# Patient Record
Sex: FEMALE | Race: WHITE | NOT HISPANIC OR LATINO
[De-identification: names, ages, dates, MRNs, and addresses within clinical notes are randomized per-mention and may not be internally consistent; named-entity substitution may affect disease eponyms.]

---

## 2022-11-23 PROBLEM — Z00.00 ENCOUNTER FOR PREVENTIVE HEALTH EXAMINATION: Status: ACTIVE | Noted: 2022-11-23

## 2022-11-30 ENCOUNTER — APPOINTMENT (OUTPATIENT)
Dept: ANTEPARTUM | Facility: CLINIC | Age: 22
End: 2022-11-30

## 2022-11-30 ENCOUNTER — ASOB RESULT (OUTPATIENT)
Age: 22
End: 2022-11-30

## 2022-11-30 PROCEDURE — 76811 OB US DETAILED SNGL FETUS: CPT

## 2023-04-10 ENCOUNTER — APPOINTMENT (OUTPATIENT)
Dept: ANTEPARTUM | Facility: CLINIC | Age: 23
End: 2023-04-10

## 2023-04-17 ENCOUNTER — APPOINTMENT (OUTPATIENT)
Dept: ANTEPARTUM | Facility: CLINIC | Age: 23
End: 2023-04-17

## 2023-04-17 ENCOUNTER — APPOINTMENT (OUTPATIENT)
Dept: ANTEPARTUM | Facility: CLINIC | Age: 23
End: 2023-04-17
Payer: COMMERCIAL

## 2023-04-17 ENCOUNTER — ASOB RESULT (OUTPATIENT)
Age: 23
End: 2023-04-17

## 2023-04-17 PROCEDURE — 76816 OB US FOLLOW-UP PER FETUS: CPT

## 2023-04-17 PROCEDURE — 76818 FETAL BIOPHYS PROFILE W/NST: CPT | Mod: 59

## 2023-04-20 ENCOUNTER — ASOB RESULT (OUTPATIENT)
Age: 23
End: 2023-04-20

## 2023-04-20 ENCOUNTER — APPOINTMENT (OUTPATIENT)
Dept: ANTEPARTUM | Facility: CLINIC | Age: 23
End: 2023-04-20
Payer: COMMERCIAL

## 2023-04-20 PROCEDURE — 76818 FETAL BIOPHYS PROFILE W/NST: CPT

## 2023-04-24 ENCOUNTER — APPOINTMENT (OUTPATIENT)
Dept: ANTEPARTUM | Facility: CLINIC | Age: 23
End: 2023-04-24
Payer: COMMERCIAL

## 2023-04-24 ENCOUNTER — ASOB RESULT (OUTPATIENT)
Age: 23
End: 2023-04-24

## 2023-04-24 ENCOUNTER — OUTPATIENT (OUTPATIENT)
Dept: OUTPATIENT SERVICES | Facility: HOSPITAL | Age: 23
LOS: 1 days | End: 2023-04-24
Payer: COMMERCIAL

## 2023-04-24 VITALS — HEART RATE: 102 BPM | SYSTOLIC BLOOD PRESSURE: 128 MMHG | DIASTOLIC BLOOD PRESSURE: 70 MMHG | OXYGEN SATURATION: 97 %

## 2023-04-24 VITALS — SYSTOLIC BLOOD PRESSURE: 135 MMHG | DIASTOLIC BLOOD PRESSURE: 86 MMHG | HEART RATE: 90 BPM

## 2023-04-24 DIAGNOSIS — Z3A.41 41 WEEKS GESTATION OF PREGNANCY: ICD-10-CM

## 2023-04-24 DIAGNOSIS — O98.813 OTHER MATERNAL INFECTIOUS AND PARASITIC DISEASES COMPLICATING PREGNANCY, THIRD TRIMESTER: ICD-10-CM

## 2023-04-24 DIAGNOSIS — B95.1 STREPTOCOCCUS, GROUP B, AS THE CAUSE OF DISEASES CLASSIFIED ELSEWHERE: ICD-10-CM

## 2023-04-24 DIAGNOSIS — O26.899 OTHER SPECIFIED PREGNANCY RELATED CONDITIONS, UNSPECIFIED TRIMESTER: ICD-10-CM

## 2023-04-24 DIAGNOSIS — O48.0 POST-TERM PREGNANCY: ICD-10-CM

## 2023-04-24 DIAGNOSIS — O34.219 MATERNAL CARE FOR UNSPECIFIED TYPE SCAR FROM PREVIOUS CESAREAN DELIVERY: ICD-10-CM

## 2023-04-24 PROCEDURE — 76818 FETAL BIOPHYS PROFILE W/NST: CPT

## 2023-04-24 PROCEDURE — 99221 1ST HOSP IP/OBS SF/LOW 40: CPT

## 2023-04-24 PROCEDURE — 59025 FETAL NON-STRESS TEST: CPT | Mod: 26

## 2023-04-24 PROCEDURE — G0378: CPT

## 2023-04-24 PROCEDURE — G0463: CPT

## 2023-04-24 NOTE — OB PROVIDER TRIAGE NOTE - HISTORY OF PRESENT ILLNESS
23yo      @  41w 2 d     presents for repeat GERRI  Denies  VB or LOF has + FM.  Pt also has mild contractions      PNC: Dr sears  PNI: uncomplicated  PNL: GBS positive      All: NKDA  Meds: PNV  PMHx: Denies  PSHx:C/S  Socialhx: Denies x 3  OBhx: C/S  GYNhx: denies    T(C): --  HR: 89 (04-24-23 @ 17:00) (89 - 102)  BP: 128/70 (04-24-23 @ 16:45) (128/70 - 128/70)  SpO2: 98% (04-24-23 @ 17:00) (97% - 98%)  Gen: NAD  Heart: RRR  Lungs: CTA B/L  Abdomen: Gravid, NT  Ext: no calf tenderness    NST:  TOCO:  VE:   EFW:  BSUS:    A/P 22y P   @     presents with  - Fetal status -   - GBS     D/W    Cecilia Blue PA-C     21yo      @  41w 2 d     presents for repeat GERRI.  Pt was seen today for post dates testing and GERRI was 5.7.  Pt sent to triage to PO hydrate and to have repeat ultrasound  Denies  VB or LOF has + FM.  Pt does have mild contractions    PNC: Dr Angulo  PNI: uncomplicated  PNL: GBS positive      All: NKDA  Meds: PNV  PMHx: Denies  PSHx: C/S  Socialhx: Denies x 3  OBhx: 2021  FT  C/S  secondary to nrfht at 7cm  9lbs 2oz, uncomplicated  GYNhx: denies fibroids, ov cysts or STDs    T(C): --  HR: 89 (04-24-23 @ 17:00) (89 - 102)  BP: 128/70 (04-24-23 @ 16:45) (128/70 - 128/70)  SpO2: 98% (04-24-23 @ 17:00) (97% - 98%)    Gen: NAD  Heart: RRR  Lungs: CTA B/L  Abdomen: Gravid, NT  Ext: no calf tenderness    NST:125 moderate variabity + accels no decels  TOCO: occasional  VE:  1/60/-3 ( per Dr Angulo  EFW: 3400  BSUS:

## 2023-04-24 NOTE — OB RN TRIAGE NOTE - FALL HARM RISK - UNIVERSAL INTERVENTIONS
Bed in lowest position, wheels locked, appropriate side rails in place/Call bell, personal items and telephone in reach/Instruct patient to call for assistance before getting out of bed or chair/Non-slip footwear when patient is out of bed/[place] to call system/Physically safe environment - no spills, clutter or unnecessary equipment/Purposeful Proactive Rounding/Room/bathroom lighting operational, light cord in reach

## 2023-04-24 NOTE — OB PROVIDER TRIAGE NOTE - NSOBPROVIDERNOTE_OBGYN_ALL_OB_FT
A/P 21yo    @ 41w 2d    presents for further evaluation secondary to low GERRI  - Fetal status - NST/TOCO  - GBS positive  - PO hydrate until 6:30p and then repeat GERRI per Dr Angulo  D/W  Dr Kev ESCOBARC A/P 21yo    @ 41w 2d    presents for further evaluation secondary to low GERRI  - Fetal status - NST/TOCO  - GBS positive  - PO hydrate until 6:30p and then repeat GERRI per Dr Angulo  D/W  Dr Kev Blue PACHIOMA      R4 ADDENDUM  - GERRI repeated, MVP 6.7  - NST reactive: 120/mod/+accel/-decel  - return precautions  - f/u in 2 days (Wednesday) w/[facility]    d/w Dr. Kev Pressley R4

## 2023-04-24 NOTE — OB RN TRIAGE NOTE - NSLDARRIVAL_OBGYN_ALL_OB_END_DATE
Information: Selecting Yes will display possible errors in your note based on the variables you have selected. This validation is only offered as a suggestion for you. PLEASE NOTE THAT THE VALIDATION TEXT WILL BE REMOVED WHEN YOU FINALIZE YOUR NOTE. IF YOU WANT TO FAX A PRELIMINARY NOTE YOU WILL NEED TO TOGGLE THIS TO 'NO' IF YOU DO NOT WANT IT IN YOUR FAXED NOTE. 24-Apr-2023 20:09

## 2023-04-24 NOTE — OB PROVIDER TRIAGE NOTE - NS_VISITREASON1_OBGYN_ALL_OB
Phone Number Called: [phone] (home)       Call outcome: left message for patient to call back regarding message below    Message: 1 st attempt     Other

## 2023-04-25 ENCOUNTER — INPATIENT (INPATIENT)
Facility: HOSPITAL | Age: 23
LOS: 2 days | Discharge: HOME | End: 2023-04-28
Attending: OBSTETRICS & GYNECOLOGY | Admitting: OBSTETRICS & GYNECOLOGY
Payer: COMMERCIAL

## 2023-04-25 ENCOUNTER — TRANSCRIPTION ENCOUNTER (OUTPATIENT)
Age: 23
End: 2023-04-25

## 2023-04-25 VITALS — OXYGEN SATURATION: 97 % | HEART RATE: 93 BPM

## 2023-04-25 DIAGNOSIS — O26.899 OTHER SPECIFIED PREGNANCY RELATED CONDITIONS, UNSPECIFIED TRIMESTER: ICD-10-CM

## 2023-04-25 DIAGNOSIS — Z34.80 ENCOUNTER FOR SUPERVISION OF OTHER NORMAL PREGNANCY, UNSPECIFIED TRIMESTER: ICD-10-CM

## 2023-04-25 LAB
BASOPHILS # BLD AUTO: 0.03 K/UL — SIGNIFICANT CHANGE UP (ref 0–0.2)
BASOPHILS NFR BLD AUTO: 0.3 % — SIGNIFICANT CHANGE UP (ref 0–2)
BLD GP AB SCN SERPL QL: NEGATIVE — SIGNIFICANT CHANGE UP
EOSINOPHIL # BLD AUTO: 0.02 K/UL — SIGNIFICANT CHANGE UP (ref 0–0.5)
EOSINOPHIL NFR BLD AUTO: 0.2 % — SIGNIFICANT CHANGE UP (ref 0–6)
HCT VFR BLD CALC: 35.7 % — SIGNIFICANT CHANGE UP (ref 34.5–45)
HGB BLD-MCNC: 12.1 G/DL — SIGNIFICANT CHANGE UP (ref 11.5–15.5)
IMM GRANULOCYTES NFR BLD AUTO: 0.6 % — SIGNIFICANT CHANGE UP (ref 0–0.9)
LYMPHOCYTES # BLD AUTO: 19.9 % — SIGNIFICANT CHANGE UP (ref 13–44)
LYMPHOCYTES # BLD AUTO: 2.27 K/UL — SIGNIFICANT CHANGE UP (ref 1–3.3)
MCHC RBC-ENTMCNC: 30.9 PG — SIGNIFICANT CHANGE UP (ref 27–34)
MCHC RBC-ENTMCNC: 33.9 GM/DL — SIGNIFICANT CHANGE UP (ref 32–36)
MCV RBC AUTO: 91.3 FL — SIGNIFICANT CHANGE UP (ref 80–100)
MONOCYTES # BLD AUTO: 0.91 K/UL — HIGH (ref 0–0.9)
MONOCYTES NFR BLD AUTO: 8 % — SIGNIFICANT CHANGE UP (ref 2–14)
NEUTROPHILS # BLD AUTO: 8.08 K/UL — HIGH (ref 1.8–7.4)
NEUTROPHILS NFR BLD AUTO: 71 % — SIGNIFICANT CHANGE UP (ref 43–77)
NRBC # BLD: 0 /100 WBCS — SIGNIFICANT CHANGE UP (ref 0–0)
PLATELET # BLD AUTO: 154 K/UL — SIGNIFICANT CHANGE UP (ref 150–400)
RBC # BLD: 3.91 M/UL — SIGNIFICANT CHANGE UP (ref 3.8–5.2)
RBC # FLD: 14 % — SIGNIFICANT CHANGE UP (ref 10.3–14.5)
RH IG SCN BLD-IMP: POSITIVE — SIGNIFICANT CHANGE UP
RH IG SCN BLD-IMP: POSITIVE — SIGNIFICANT CHANGE UP
T PALLIDUM AB TITR SER: NEGATIVE — SIGNIFICANT CHANGE UP
WBC # BLD: 11.38 K/UL — HIGH (ref 3.8–10.5)
WBC # FLD AUTO: 11.38 K/UL — HIGH (ref 3.8–10.5)

## 2023-04-25 RX ORDER — CITRIC ACID/SODIUM CITRATE 300-500 MG
15 SOLUTION, ORAL ORAL EVERY 6 HOURS
Refills: 0 | Status: DISCONTINUED | OUTPATIENT
Start: 2023-04-25 | End: 2023-04-26

## 2023-04-25 RX ORDER — SODIUM CHLORIDE 9 MG/ML
1000 INJECTION, SOLUTION INTRAVENOUS
Refills: 0 | Status: DISCONTINUED | OUTPATIENT
Start: 2023-04-25 | End: 2023-04-26

## 2023-04-25 RX ORDER — CHLORHEXIDINE GLUCONATE 213 G/1000ML
1 SOLUTION TOPICAL ONCE
Refills: 0 | Status: DISCONTINUED | OUTPATIENT
Start: 2023-04-25 | End: 2023-04-26

## 2023-04-25 RX ORDER — AMPICILLIN TRIHYDRATE 250 MG
2 CAPSULE ORAL ONCE
Refills: 0 | Status: COMPLETED | OUTPATIENT
Start: 2023-04-25 | End: 2023-04-25

## 2023-04-25 RX ORDER — AMPICILLIN TRIHYDRATE 250 MG
1 CAPSULE ORAL EVERY 4 HOURS
Refills: 0 | Status: DISCONTINUED | OUTPATIENT
Start: 2023-04-25 | End: 2023-04-26

## 2023-04-25 RX ORDER — OXYTOCIN 10 UNIT/ML
333.33 VIAL (ML) INJECTION
Qty: 20 | Refills: 0 | Status: DISCONTINUED | OUTPATIENT
Start: 2023-04-25 | End: 2023-04-26

## 2023-04-25 RX ADMIN — SODIUM CHLORIDE 125 MILLILITER(S): 9 INJECTION, SOLUTION INTRAVENOUS at 11:58

## 2023-04-25 RX ADMIN — Medication 108 GRAM(S): at 16:17

## 2023-04-25 RX ADMIN — Medication 200 GRAM(S): at 12:15

## 2023-04-25 RX ADMIN — Medication 108 GRAM(S): at 20:13

## 2023-04-25 RX ADMIN — SODIUM CHLORIDE 125 MILLILITER(S): 9 INJECTION, SOLUTION INTRAVENOUS at 12:30

## 2023-04-25 NOTE — OB PROVIDER H&P - NSLOWPPHRISK_OBGYN_A_OB
Shah Pregnancy/Less than or equal to 4 previous vaginal births/No known bleeding disorder/No history of postpartum hemorrhage

## 2023-04-25 NOTE — OB RN PATIENT PROFILE - POST PARTUM DEPRESSION SCREEN OB 2
Patient is feeling better and will be conservative. Patient questioning if she maybe pulled a muscle. Discussed surgical adhesions and signs and symptoms to watch for if needs to be seen. Patient will continue with tylenol that she takes twice a day. Patient was advised not to take more than 3,000 mg a day. Advised to be seen if symptoms get worse.   no

## 2023-04-25 NOTE — OB PROVIDER H&P - HISTORY OF PRESENT ILLNESS
21yo  EDC 4/15/23 @ 40w3d sent from the office in early labor. Pt c/o ctx x 2days and has made cervical change from 1cm yesterday to 4acm today. Denies LOF/VB. +FM  GBS positive    EFW 3400  PNC uncomplicated  PNL:    OB:  pltcs cat 2 FHT 9#  GYN:Denies fibroids/ovarian cysts/STI's/abnl pap  PMH: none  PSH: c/s  MEDS: PNV  ALL:NKDA  Psych: Denies anxiety/depression  Accepts blood.

## 2023-04-25 NOTE — OB PROVIDER LABOR PROGRESS NOTE - NS_SUBJECTIVE/OBJECTIVE_OBGYN_ALL_OB_FT
Pt is considering an epidural. Requests vaginal exam.  T(C): 36.7 (04-25-23 @ 12:26), Max: 36.8 (04-24-23 @ 16:55)  HR: 101 (04-25-23 @ 12:48) (77 - 106)  BP: 132/77 (04-25-23 @ 12:26) (128/70 - 135/86)  RR: 20 (04-25-23 @ 12:26) (14 - 20)  SpO2: 94% (04-25-23 @ 12:48) (91% - 99%)

## 2023-04-25 NOTE — OB PROVIDER H&P - ASSESSMENT
21yo  EDC 4/15/23 @ 40w3d in labor, GBS positive, category 2 FHT but overall reassuring, for TOLAC  admit  efm/toco  ivf  routine labs  Ampicillin for GBS prophylaxis  expectant management  anticipate   anesthesia consult  D/W Dr Kev Spencer PAC n/a

## 2023-04-26 LAB
COVID-19 SPIKE DOMAIN AB INTERP: POSITIVE
COVID-19 SPIKE DOMAIN ANTIBODY RESULT: >250 U/ML — HIGH
SARS-COV-2 IGG+IGM SERPL QL IA: >250 U/ML — HIGH
SARS-COV-2 IGG+IGM SERPL QL IA: POSITIVE

## 2023-04-26 PROCEDURE — 59514 CESAREAN DELIVERY ONLY: CPT | Mod: AS,U9

## 2023-04-26 RX ORDER — DEXAMETHASONE 0.5 MG/5ML
4 ELIXIR ORAL EVERY 6 HOURS
Refills: 0 | Status: DISCONTINUED | OUTPATIENT
Start: 2023-04-26 | End: 2023-04-27

## 2023-04-26 RX ORDER — NALOXONE HYDROCHLORIDE 4 MG/.1ML
0.1 SPRAY NASAL
Refills: 0 | Status: DISCONTINUED | OUTPATIENT
Start: 2023-04-26 | End: 2023-04-27

## 2023-04-26 RX ORDER — MORPHINE SULFATE 50 MG/1
2 CAPSULE, EXTENDED RELEASE ORAL ONCE
Refills: 0 | Status: DISCONTINUED | OUTPATIENT
Start: 2023-04-26 | End: 2023-04-27

## 2023-04-26 RX ORDER — ACETAMINOPHEN 500 MG
975 TABLET ORAL
Refills: 0 | Status: DISCONTINUED | OUTPATIENT
Start: 2023-04-26 | End: 2023-04-28

## 2023-04-26 RX ORDER — OXYCODONE HYDROCHLORIDE 5 MG/1
5 TABLET ORAL
Refills: 0 | Status: DISCONTINUED | OUTPATIENT
Start: 2023-04-26 | End: 2023-04-27

## 2023-04-26 RX ORDER — IBUPROFEN 200 MG
600 TABLET ORAL EVERY 6 HOURS
Refills: 0 | Status: COMPLETED | OUTPATIENT
Start: 2023-04-26 | End: 2024-03-24

## 2023-04-26 RX ORDER — LANOLIN
1 OINTMENT (GRAM) TOPICAL EVERY 6 HOURS
Refills: 0 | Status: DISCONTINUED | OUTPATIENT
Start: 2023-04-26 | End: 2023-04-28

## 2023-04-26 RX ORDER — SIMETHICONE 80 MG/1
80 TABLET, CHEWABLE ORAL EVERY 4 HOURS
Refills: 0 | Status: DISCONTINUED | OUTPATIENT
Start: 2023-04-26 | End: 2023-04-28

## 2023-04-26 RX ORDER — KETOROLAC TROMETHAMINE 30 MG/ML
30 SYRINGE (ML) INJECTION EVERY 6 HOURS
Refills: 0 | Status: DISCONTINUED | OUTPATIENT
Start: 2023-04-26 | End: 2023-04-27

## 2023-04-26 RX ORDER — OXYTOCIN 10 UNIT/ML
333.33 VIAL (ML) INJECTION
Qty: 20 | Refills: 0 | Status: DISCONTINUED | OUTPATIENT
Start: 2023-04-26 | End: 2023-04-28

## 2023-04-26 RX ORDER — ONDANSETRON 8 MG/1
4 TABLET, FILM COATED ORAL EVERY 6 HOURS
Refills: 0 | Status: DISCONTINUED | OUTPATIENT
Start: 2023-04-26 | End: 2023-04-27

## 2023-04-26 RX ORDER — DIPHENHYDRAMINE HCL 50 MG
25 CAPSULE ORAL EVERY 6 HOURS
Refills: 0 | Status: DISCONTINUED | OUTPATIENT
Start: 2023-04-26 | End: 2023-04-28

## 2023-04-26 RX ORDER — OXYCODONE HYDROCHLORIDE 5 MG/1
5 TABLET ORAL
Refills: 0 | Status: COMPLETED | OUTPATIENT
Start: 2023-04-26 | End: 2023-05-03

## 2023-04-26 RX ORDER — TETANUS TOXOID, REDUCED DIPHTHERIA TOXOID AND ACELLULAR PERTUSSIS VACCINE, ADSORBED 5; 2.5; 8; 8; 2.5 [IU]/.5ML; [IU]/.5ML; UG/.5ML; UG/.5ML; UG/.5ML
0.5 SUSPENSION INTRAMUSCULAR ONCE
Refills: 0 | Status: DISCONTINUED | OUTPATIENT
Start: 2023-04-26 | End: 2023-04-28

## 2023-04-26 RX ORDER — NALBUPHINE HYDROCHLORIDE 10 MG/ML
2.5 INJECTION, SOLUTION INTRAMUSCULAR; INTRAVENOUS; SUBCUTANEOUS EVERY 6 HOURS
Refills: 0 | Status: DISCONTINUED | OUTPATIENT
Start: 2023-04-26 | End: 2023-04-27

## 2023-04-26 RX ORDER — HEPARIN SODIUM 5000 [USP'U]/ML
5000 INJECTION INTRAVENOUS; SUBCUTANEOUS
Refills: 0 | Status: DISCONTINUED | OUTPATIENT
Start: 2023-04-26 | End: 2023-04-28

## 2023-04-26 RX ORDER — OXYCODONE HYDROCHLORIDE 5 MG/1
5 TABLET ORAL ONCE
Refills: 0 | Status: DISCONTINUED | OUTPATIENT
Start: 2023-04-26 | End: 2023-04-28

## 2023-04-26 RX ORDER — OXYCODONE HYDROCHLORIDE 5 MG/1
10 TABLET ORAL
Refills: 0 | Status: DISCONTINUED | OUTPATIENT
Start: 2023-04-26 | End: 2023-04-27

## 2023-04-26 RX ORDER — SODIUM CHLORIDE 9 MG/ML
1000 INJECTION, SOLUTION INTRAVENOUS
Refills: 0 | Status: DISCONTINUED | OUTPATIENT
Start: 2023-04-26 | End: 2023-04-28

## 2023-04-26 RX ORDER — CEFAZOLIN SODIUM 1 G
2000 VIAL (EA) INJECTION EVERY 8 HOURS
Refills: 0 | Status: COMPLETED | OUTPATIENT
Start: 2023-04-26 | End: 2023-04-28

## 2023-04-26 RX ORDER — MAGNESIUM HYDROXIDE 400 MG/1
30 TABLET, CHEWABLE ORAL
Refills: 0 | Status: DISCONTINUED | OUTPATIENT
Start: 2023-04-26 | End: 2023-04-28

## 2023-04-26 RX ADMIN — Medication 975 MILLIGRAM(S): at 17:25

## 2023-04-26 RX ADMIN — Medication 30 MILLIGRAM(S): at 21:22

## 2023-04-26 RX ADMIN — HEPARIN SODIUM 5000 UNIT(S): 5000 INJECTION INTRAVENOUS; SUBCUTANEOUS at 23:54

## 2023-04-26 RX ADMIN — Medication 975 MILLIGRAM(S): at 23:53

## 2023-04-26 RX ADMIN — Medication 30 MILLIGRAM(S): at 13:45

## 2023-04-26 RX ADMIN — Medication 30 MILLIGRAM(S): at 20:19

## 2023-04-26 RX ADMIN — Medication 100 MILLIGRAM(S): at 13:50

## 2023-04-26 RX ADMIN — Medication 100 MILLIGRAM(S): at 23:57

## 2023-04-26 RX ADMIN — HEPARIN SODIUM 5000 UNIT(S): 5000 INJECTION INTRAVENOUS; SUBCUTANEOUS at 13:50

## 2023-04-26 RX ADMIN — Medication 975 MILLIGRAM(S): at 18:15

## 2023-04-26 RX ADMIN — Medication 30 MILLIGRAM(S): at 14:15

## 2023-04-26 RX ADMIN — Medication 108 GRAM(S): at 00:27

## 2023-04-26 RX ADMIN — Medication 30 MILLIGRAM(S): at 04:45

## 2023-04-26 RX ADMIN — Medication 1000 MILLIUNIT(S)/MIN: at 05:48

## 2023-04-26 NOTE — OB RN INTRAOPERATIVE NOTE - NSSELHIDDEN_OBGYN_ALL_OB_FT
[NS_DeliveryAttending1_OBGYN_ALL_OB_FT:[email or web address]=],[NS_DeliveryRN_OBGYN_ALL_OB_FT:[id number]=] [NS_DeliveryAttending1_OBGYN_ALL_OB_FT:MTEwMDExOTA=],[NS_DeliveryRN_OBGYN_ALL_OB_FT:[id number]=],[NS_DeliveryAttending2_OBGYN_ALL_OB_FT:[id number]==] [NS_DeliveryAttending1_OBGYN_ALL_OB_FT:MTEwMDExOTA=],[NS_DeliveryRN_OBGYN_ALL_OB_FT:[id number]=],[NS_DeliveryAttending2_OBGYN_ALL_OB_FT:[id number]==],[NS_DeliveryAssist1_OBGYN_ALL_OB_FT:[id number]=]

## 2023-04-26 NOTE — OB PROVIDER LABOR PROGRESS NOTE - ASSESSMENT
the patient has been fully dilated for over 1.5 hours and the baby is still at -1. i explained to the patient that i would like to do a cesarewan section, as there is inadequate progress and ii am now more concerned for a ruptured uterus if we continue the labor. the patient is refusing  section at this time and is statingthat she needs more time. i explained to her that a ruptured uterus would limit her family size and that a  section now would be far safer. she ndoes not accept my arguments. will give her more time under very careful observation. 
the patient is now fully dilated and the vertex at -1.will allow more time for natural descent before pushing. the patient deeply desires a TOLAC and is aware of the risk of uterine rupture with a TOLAC. she is aware that if the uterus ruptures there is a chance  of loss of or damage to the uterus, loss of or damage to the fetus, hemorrhage, transfusion multiple units blood , DIC, SICU admission. she accepts these risks
pt does not want epidural at this time  cw expectant mgmt

## 2023-04-26 NOTE — OB RN DELIVERY SUMMARY - NS_SEPSISRSKCALC_OBGYN_ALL_OB_FT
No temperature has been documented for this patient in CPN or on the OB Flowsheet. Ensure the highest temperature during labor was documented on the OB Flowsheet.  No gestational age at birth has been documented. Ensure delivery date/time has been entered above.  Rupture of membranes must be entered above.   EOS calculated successfully. EOS Risk Factor: 0.5/1000 live births ([facility] national incidence); GA=41w4d; Temp=98.78; ROM=7.1; GBS='Negative'; Antibiotics='Broad spectrum antibiotics > 4 hrs prior to birth'

## 2023-04-26 NOTE — OB NEONATOLOGY/PEDIATRICIAN DELIVERY SUMMARY - NSPEDSNEONOTESA_OBGYN_ALL_OB_FT
Called by Dr Herndon to attend  repeat  delivery. Baby is  product of a 41 4/7 week gestation born to a G 2 P 1, 22 year old female.   Maternal labs include: Blood Type  O+, HIV neg, RPR neg, Hep B[ - ], GBS  positive on 3/23 with adequate intrapartum antibiotic prophylaxis (Ampicillin x34), rest is unremarkable. Maternal history is significant for c/s x1 in . Pregnancy was complicated by  failed TOLAC, nuchal cord x1.   ROM at  2110 with clear fluids, approximately  7hrs.  Resuscitation included: delayed cord clamping x30 seconds, infant brought to warmer, warmed, dried, suctioned stimulated. CPAP 5 initiated around 6 minutes of life for work of breathing and desaturations, FiO2 titrated to maintain target saturations. Apgars were: 8/9 EOS score 0.13. Admit to NICU for management of respiratory distress.

## 2023-04-26 NOTE — PROGRESS NOTE ADULT - SUBJECTIVE AND OBJECTIVE BOX
Day 0 of Anesthesia Pain Management Service    SUBJECTIVE: Doing ok  Pain Scale Score:          [X] Refer to charted pain scores    THERAPY:  s/p   2  mg PF epidural morphine on 4\26\2023      MEDICATIONS  (STANDING):  acetaminophen     Tablet .. 975 milliGRAM(s) Oral <User Schedule>  ceFAZolin   IVPB 2000 milliGRAM(s) IV Intermittent every 8 hours  diphtheria/tetanus/pertussis (acellular) Vaccine (Adacel) 0.5 milliLiter(s) IntraMuscular once  heparin   Injectable 5000 Unit(s) SubCutaneous two times a day  ibuprofen  Tablet. 600 milliGRAM(s) Oral every 6 hours  ketorolac   Injectable 30 milliGRAM(s) IV Push every 6 hours  lactated ringers. 1000 milliLiter(s) (125 mL/Hr) IV Continuous <Continuous>  morphine PF Epidural 2 milliGRAM(s) Epidural once  oxytocin Infusion 333.333 milliUNIT(s)/Min (1000 mL/Hr) IV Continuous <Continuous>    MEDICATIONS  (PRN):  dexAMETHasone  Injectable 4 milliGRAM(s) IV Push every 6 hours PRN Nausea  diphenhydrAMINE 25 milliGRAM(s) Oral every 6 hours PRN Pruritus  lanolin Ointment 1 Application(s) Topical every 6 hours PRN Sore Nipples  magnesium hydroxide Suspension 30 milliLiter(s) Oral two times a day PRN Constipation  nalbuphine Injectable 2.5 milliGRAM(s) IV Push every 6 hours PRN Pruritus  naloxone Injectable 0.1 milliGRAM(s) IV Push every 3 minutes PRN For ANY of the following changes in patient status:  A. Breaths Per Minute LESS THAN 10, B. Oxygen saturation LESS THAN 90%, C. Sedation score of 6 for Stop After: 4 Times  ondansetron Injectable 4 milliGRAM(s) IV Push every 6 hours PRN Nausea  oxyCODONE    IR 5 milliGRAM(s) Oral once PRN Moderate to Severe Pain (4-10)  oxyCODONE    IR 5 milliGRAM(s) Oral every 3 hours PRN Mild Pain (1 - 3)  oxyCODONE    IR 5 milliGRAM(s) Oral every 3 hours PRN Moderate to Severe Pain (4-10)  oxyCODONE    IR 10 milliGRAM(s) Oral every 3 hours PRN Moderate Pain (4 - 6)  simethicone 80 milliGRAM(s) Chew every 4 hours PRN Gas      OBJECTIVE:    Sedation:        	[X] Alert	 [ ] Drowsy	[ ] Arousable      [ ] Asleep       [ ] Unresponsive    Side Effects:	[X] None 	[ ] Nausea	[ ] Vomiting         [ ] Pruritus  		[ ] Weakness            [ ] Numbness	          [ ] Other:    Vital Signs Last 24 Hrs  T(C): 36.6 (26 Apr 2023 08:15), Max: 37.1 (26 Apr 2023 01:05)  T(F): 97.9 (26 Apr 2023 08:15), Max: 98.8 (26 Apr 2023 03:02)  HR: 83 (26 Apr 2023 08:15) (76 - 178)  BP: 122/74 (26 Apr 2023 08:15) (92/51 - 136/75)  BP(mean): 84 (26 Apr 2023 07:20) (84 - 91)  RR: 16 (26 Apr 2023 08:15) (11 - 23)  SpO2: 96% (26 Apr 2023 08:15) (86% - 99%)    Parameters below as of 25 Apr 2023 12:26  Patient On (Oxygen Delivery Method): room air        ASSESSMENT/ PLAN  [X] Patient to be transitioned to prn analgesics after 24 hours  [X] Pain management per primary service, pain service to sign off   [X]Documentation and Verification of current medications

## 2023-04-26 NOTE — OB PROVIDER DELIVERY SUMMARY - NSPROVIDERDELIVERYNOTE_OBGYN_ALL_OB_FT
viable male infant apgar 8/9 weight 8-4. repeat  section was performed after over two hours of full dilation with the vertex at -1. after adequate anasthesia was given, the abdomen was opened but upon exposure, a large ballooned out structure emerged, adherent to everything around it. it was ultilately identified as being the bladder. dr maxwell, gyn surgeon, was called in to assist with identification and dissection.   the bladder was resected from the uterus and from surroundung tissue, and pushed down, finally exposing the uterus. the uterus was distorted with a Bandles ring but a low flap incision was able to be made, and th fetus, OP and above the spines, was easily delivered abdominally. upon delivery of the fetus, and nthe placenta, the uterus was able to be repaired in the normal fashion- with a second imbricating layer. the bladder ultimately drained 2000 cc (after valenzuela was emptied of 800 cc just prior to surgery). . viable male infant apgar 8/9 weight 8-4. repeat  section was performed after over two hours of full dilation with the vertex at -1. after adequate anasthesia was given, the abdomen was opened but upon exposure, a large ballooned out structure emerged, adherent to everything around it. it was ultilately identified as being the bladder. dr maxwell, gyn surgeon, was called in to assist with identification and dissection.   the bladder was resected from the uterus and from surroundung tissue, and pushed down, finally exposing the uterus. the uterus was distorted with a Bandles ring but a low flap incision was able to be made, and th fetus, OP and above the spines, was easily delivered abdominally. upon delivery of the fetus, and nthe placenta, the uterus was able to be repaired in the normal fashion- with a second imbricating layer. the bladder ultimately drained 2000 cc (after valenzuela was emptied of 800 cc just prior to surgery). .    Dictation: [id number]

## 2023-04-26 NOTE — OB PROVIDER DELIVERY SUMMARY - NSSELHIDDEN_OBGYN_ALL_OB_FT
[NS_DeliveryAttending1_OBGYN_ALL_OB_FT:[email or web address]=],[NS_DeliveryRN_OBGYN_ALL_OB_FT:[id number]=],[NS_DeliveryAttending2_OBGYN_ALL_OB_FT:[id number]==] [NS_DeliveryAttending1_OBGYN_ALL_OB_FT:MTEwMDExOTA=],[NS_DeliveryRN_OBGYN_ALL_OB_FT:[id number]=],[NS_DeliveryAttending2_OBGYN_ALL_OB_FT:[id number]==],[NS_DeliveryAssist1_OBGYN_ALL_OB_FT:[id number]=]

## 2023-04-26 NOTE — OB RN DELIVERY SUMMARY - NSSELHIDDEN_OBGYN_ALL_OB_FT
[NS_DeliveryAttending1_OBGYN_ALL_OB_FT:MTEwMDExOTA=],[NS_DeliveryRN_OBGYN_ALL_OB_FT:[id number]=] [NS_DeliveryAttending1_OBGYN_ALL_OB_FT:MTEwMDExOTA=],[NS_DeliveryRN_OBGYN_ALL_OB_FT:[id number]=],[NS_DeliveryAttending2_OBGYN_ALL_OB_FT:[id number]==]

## 2023-04-27 LAB
BASOPHILS # BLD AUTO: 0.04 K/UL — SIGNIFICANT CHANGE UP (ref 0–0.2)
BASOPHILS NFR BLD AUTO: 0.4 % — SIGNIFICANT CHANGE UP (ref 0–2)
EOSINOPHIL # BLD AUTO: 0.04 K/UL — SIGNIFICANT CHANGE UP (ref 0–0.5)
EOSINOPHIL NFR BLD AUTO: 0.4 % — SIGNIFICANT CHANGE UP (ref 0–6)
HCT VFR BLD CALC: 29.3 % — LOW (ref 34.5–45)
HGB BLD-MCNC: 9.5 G/DL — LOW (ref 11.5–15.5)
IMM GRANULOCYTES NFR BLD AUTO: 0.9 % — SIGNIFICANT CHANGE UP (ref 0–0.9)
LYMPHOCYTES # BLD AUTO: 3.37 K/UL — HIGH (ref 1–3.3)
LYMPHOCYTES # BLD AUTO: 32.2 % — SIGNIFICANT CHANGE UP (ref 13–44)
MCHC RBC-ENTMCNC: 30.6 PG — SIGNIFICANT CHANGE UP (ref 27–34)
MCHC RBC-ENTMCNC: 32.4 GM/DL — SIGNIFICANT CHANGE UP (ref 32–36)
MCV RBC AUTO: 94.5 FL — SIGNIFICANT CHANGE UP (ref 80–100)
MONOCYTES # BLD AUTO: 0.89 K/UL — SIGNIFICANT CHANGE UP (ref 0–0.9)
MONOCYTES NFR BLD AUTO: 8.5 % — SIGNIFICANT CHANGE UP (ref 2–14)
NEUTROPHILS # BLD AUTO: 6.05 K/UL — SIGNIFICANT CHANGE UP (ref 1.8–7.4)
NEUTROPHILS NFR BLD AUTO: 57.6 % — SIGNIFICANT CHANGE UP (ref 43–77)
NRBC # BLD: 0 /100 WBCS — SIGNIFICANT CHANGE UP (ref 0–0)
PLATELET # BLD AUTO: 104 K/UL — LOW (ref 150–400)
RBC # BLD: 3.1 M/UL — LOW (ref 3.8–5.2)
RBC # FLD: 14.6 % — HIGH (ref 10.3–14.5)
WBC # BLD: 10.48 K/UL — SIGNIFICANT CHANGE UP (ref 3.8–10.5)
WBC # FLD AUTO: 10.48 K/UL — SIGNIFICANT CHANGE UP (ref 3.8–10.5)

## 2023-04-27 RX ORDER — OXYCODONE HYDROCHLORIDE 5 MG/1
5 TABLET ORAL ONCE
Refills: 0 | Status: DISCONTINUED | OUTPATIENT
Start: 2023-04-27 | End: 2023-04-28

## 2023-04-27 RX ORDER — OXYCODONE HYDROCHLORIDE 5 MG/1
5 TABLET ORAL
Refills: 0 | Status: DISCONTINUED | OUTPATIENT
Start: 2023-04-27 | End: 2023-04-28

## 2023-04-27 RX ORDER — IBUPROFEN 200 MG
600 TABLET ORAL EVERY 6 HOURS
Refills: 0 | Status: DISCONTINUED | OUTPATIENT
Start: 2023-04-27 | End: 2023-04-28

## 2023-04-27 RX ORDER — SENNA PLUS 8.6 MG/1
1 TABLET ORAL DAILY
Refills: 0 | Status: DISCONTINUED | OUTPATIENT
Start: 2023-04-27 | End: 2023-04-28

## 2023-04-27 RX ORDER — ASCORBIC ACID 60 MG
500 TABLET,CHEWABLE ORAL DAILY
Refills: 0 | Status: DISCONTINUED | OUTPATIENT
Start: 2023-04-27 | End: 2023-04-28

## 2023-04-27 RX ORDER — FERROUS SULFATE 325(65) MG
325 TABLET ORAL
Refills: 0 | Status: DISCONTINUED | OUTPATIENT
Start: 2023-04-27 | End: 2023-04-28

## 2023-04-27 RX ADMIN — Medication 600 MILLIGRAM(S): at 09:11

## 2023-04-27 RX ADMIN — OXYCODONE HYDROCHLORIDE 5 MILLIGRAM(S): 5 TABLET ORAL at 16:40

## 2023-04-27 RX ADMIN — Medication 975 MILLIGRAM(S): at 05:18

## 2023-04-27 RX ADMIN — Medication 600 MILLIGRAM(S): at 10:00

## 2023-04-27 RX ADMIN — Medication 600 MILLIGRAM(S): at 14:29

## 2023-04-27 RX ADMIN — OXYCODONE HYDROCHLORIDE 5 MILLIGRAM(S): 5 TABLET ORAL at 19:47

## 2023-04-27 RX ADMIN — HEPARIN SODIUM 5000 UNIT(S): 5000 INJECTION INTRAVENOUS; SUBCUTANEOUS at 12:12

## 2023-04-27 RX ADMIN — OXYCODONE HYDROCHLORIDE 5 MILLIGRAM(S): 5 TABLET ORAL at 16:04

## 2023-04-27 RX ADMIN — Medication 100 MILLIGRAM(S): at 16:46

## 2023-04-27 RX ADMIN — Medication 600 MILLIGRAM(S): at 20:40

## 2023-04-27 RX ADMIN — SIMETHICONE 80 MILLIGRAM(S): 80 TABLET, CHEWABLE ORAL at 09:21

## 2023-04-27 RX ADMIN — Medication 600 MILLIGRAM(S): at 21:10

## 2023-04-27 RX ADMIN — Medication 100 MILLIGRAM(S): at 09:09

## 2023-04-27 RX ADMIN — Medication 975 MILLIGRAM(S): at 19:01

## 2023-04-27 RX ADMIN — Medication 600 MILLIGRAM(S): at 15:30

## 2023-04-27 RX ADMIN — SIMETHICONE 80 MILLIGRAM(S): 80 TABLET, CHEWABLE ORAL at 18:10

## 2023-04-27 RX ADMIN — Medication 975 MILLIGRAM(S): at 18:09

## 2023-04-27 RX ADMIN — Medication 975 MILLIGRAM(S): at 06:40

## 2023-04-27 RX ADMIN — Medication 30 MILLIGRAM(S): at 02:21

## 2023-04-27 RX ADMIN — Medication 975 MILLIGRAM(S): at 12:10

## 2023-04-27 RX ADMIN — Medication 500 MILLIGRAM(S): at 18:09

## 2023-04-27 RX ADMIN — Medication 975 MILLIGRAM(S): at 00:40

## 2023-04-27 RX ADMIN — SENNA PLUS 1 TABLET(S): 8.6 TABLET ORAL at 20:40

## 2023-04-27 RX ADMIN — OXYCODONE HYDROCHLORIDE 5 MILLIGRAM(S): 5 TABLET ORAL at 19:17

## 2023-04-27 RX ADMIN — SIMETHICONE 80 MILLIGRAM(S): 80 TABLET, CHEWABLE ORAL at 14:23

## 2023-04-27 RX ADMIN — Medication 325 MILLIGRAM(S): at 18:09

## 2023-04-27 RX ADMIN — Medication 975 MILLIGRAM(S): at 13:00

## 2023-04-27 RX ADMIN — Medication 30 MILLIGRAM(S): at 03:40

## 2023-04-27 NOTE — CHART NOTE - NSCHARTNOTEFT_GEN_A_CORE
pt requesting epidural  T(C): 36.9 (04-25-23 @ 15:23), Max: 36.9 (04-25-23 @ 15:23)  HR: 121 (04-25-23 @ 16:35) (77 - 121)  BP: 120/64 (04-25-23 @ 15:23) (120/64 - 135/86)  RR: 18 (04-25-23 @ 15:23) (14 - 20)  SpO2: 99% (04-25-23 @ 16:35) (90% - 99%)  EFM cat 1  TOCO q2-6m  VE deferred  called anesthesia for epidural  Lucila Spencer PAC
PA Anemia NOTE     POD#1        Vital Signs Last 24 Hrs  T(C): 36.9 (2023 09:43), Max: 37.1 (2023 11:22)  T(F): 98.4 (2023 09:43), Max: 98.8 (2023 21:00)  HR: 78 (2023 09:43) (73 - 94)  BP: 118/74 (2023 09:43) (100/66 - 121/80)  RR: 18 (2023 09:43) (16 - 18)  SpO2: 98% (2023 09:43) (96% - 98%)    Parameters below as of 2023 09:43  Patient On (Oxygen Delivery Method): room air                 9.5    10.48 )-----------( 104      ( 04-27 @ 06:38 )             29.3                12.1   11.38 )-----------( 154      ( 04-25 @ 12:45 )             35.7     Assessment:  22 y.o. S/P  C/S POD # 1 with anemia due to acute blood loss-VSS/Asx-not requiring blood tranfusion for Iron Supplementation  Plan:  - Ferrous Sulfate, Colace, Vitamin C supplementation.  - Monitor for signs/symptoms of anemia.     ZACHARY Romeo

## 2023-04-27 NOTE — PROGRESS NOTE ADULT - SUBJECTIVE AND OBJECTIVE BOX
Postpartum Note-  Section POD#1    Prenatal Labs  Blood type: O Positive  Rubella IgG: imm  RPR: Negative      S: Patient w/o complaints, pain is controlled well. Pt is OOB, tolerating PO, passing flatus and voiding. Vaginal bleeding mild to moderate. Denies dizziness, CP, SOB, n/v, abdominal pain or calf pain.    Pmhx/surg: ' - c/section     O:  Vital Signs Last 24 Hrs  T(C): 36.4 (2023 05:39), Max: 37.1 (2023 11:22)  T(F): 97.6 (2023 05:39), Max: 98.8 (2023 21:00)  HR: 73 (2023 05:39) (73 - 94)  BP: 121/80 (2023 05:39) (100/66 - 122/74)  RR: 18 (2023 05:39) (16 - 18)  SpO2: 98% (2023 05:39) (96% - 98%)    Parameters below as of 2023 05:39  Patient On (Oxygen Delivery Method): room air      I&O's Summary    2023 07:01  -  2023 07:00  --------------------------------------------------------  IN: 0 mL / OUT: 1400 mL / NET: -1400 mL        Gen: NAD  Abdomen: Soft,  w/ appropriate tenderness, non-distended, fundus firm.  Incision:  Dressing removed--  SubQ     Clean/dry/ intact.  - erythema     - ecchymosis     Lochia WNL  Ext: SCDs in place. -Edema. Nontender b/l.    LABS:             9.5    10.48 )-----------( 104      (  @ 06:38 )             29.3                12.1   11.38 )-----------( 154      (  @ 12:45 )             35.7

## 2023-04-28 ENCOUNTER — TRANSCRIPTION ENCOUNTER (OUTPATIENT)
Age: 23
End: 2023-04-28

## 2023-04-28 VITALS
HEART RATE: 82 BPM | HEIGHT: 62 IN | TEMPERATURE: 98 F | RESPIRATION RATE: 18 BRPM | DIASTOLIC BLOOD PRESSURE: 73 MMHG | SYSTOLIC BLOOD PRESSURE: 115 MMHG | WEIGHT: 154.98 LBS | OXYGEN SATURATION: 98 %

## 2023-04-28 DIAGNOSIS — D62 ACUTE POSTHEMORRHAGIC ANEMIA: ICD-10-CM

## 2023-04-28 PROCEDURE — 36415 COLL VENOUS BLD VENIPUNCTURE: CPT

## 2023-04-28 PROCEDURE — 86850 RBC ANTIBODY SCREEN: CPT

## 2023-04-28 PROCEDURE — 86780 TREPONEMA PALLIDUM: CPT

## 2023-04-28 PROCEDURE — 86769 SARS-COV-2 COVID-19 ANTIBODY: CPT

## 2023-04-28 PROCEDURE — 85025 COMPLETE CBC W/AUTO DIFF WBC: CPT

## 2023-04-28 PROCEDURE — 59025 FETAL NON-STRESS TEST: CPT

## 2023-04-28 PROCEDURE — 59050 FETAL MONITOR W/REPORT: CPT

## 2023-04-28 PROCEDURE — 86901 BLOOD TYPING SEROLOGIC RH(D): CPT

## 2023-04-28 PROCEDURE — 86900 BLOOD TYPING SEROLOGIC ABO: CPT

## 2023-04-28 RX ORDER — ACETAMINOPHEN 500 MG
3 TABLET ORAL
Qty: 0 | Refills: 0 | DISCHARGE
Start: 2023-04-28

## 2023-04-28 RX ORDER — IBUPROFEN 200 MG
1 TABLET ORAL
Qty: 0 | Refills: 0 | DISCHARGE
Start: 2023-04-28

## 2023-04-28 RX ADMIN — Medication 975 MILLIGRAM(S): at 06:41

## 2023-04-28 RX ADMIN — Medication 600 MILLIGRAM(S): at 03:15

## 2023-04-28 RX ADMIN — Medication 600 MILLIGRAM(S): at 02:45

## 2023-04-28 RX ADMIN — Medication 975 MILLIGRAM(S): at 12:26

## 2023-04-28 RX ADMIN — SIMETHICONE 80 MILLIGRAM(S): 80 TABLET, CHEWABLE ORAL at 06:11

## 2023-04-28 RX ADMIN — OXYCODONE HYDROCHLORIDE 5 MILLIGRAM(S): 5 TABLET ORAL at 00:51

## 2023-04-28 RX ADMIN — Medication 600 MILLIGRAM(S): at 14:07

## 2023-04-28 RX ADMIN — OXYCODONE HYDROCHLORIDE 5 MILLIGRAM(S): 5 TABLET ORAL at 15:31

## 2023-04-28 RX ADMIN — SIMETHICONE 80 MILLIGRAM(S): 80 TABLET, CHEWABLE ORAL at 15:32

## 2023-04-28 RX ADMIN — OXYCODONE HYDROCHLORIDE 5 MILLIGRAM(S): 5 TABLET ORAL at 11:15

## 2023-04-28 RX ADMIN — Medication 975 MILLIGRAM(S): at 06:11

## 2023-04-28 RX ADMIN — OXYCODONE HYDROCHLORIDE 5 MILLIGRAM(S): 5 TABLET ORAL at 16:08

## 2023-04-28 RX ADMIN — SIMETHICONE 80 MILLIGRAM(S): 80 TABLET, CHEWABLE ORAL at 10:46

## 2023-04-28 RX ADMIN — OXYCODONE HYDROCHLORIDE 5 MILLIGRAM(S): 5 TABLET ORAL at 00:21

## 2023-04-28 RX ADMIN — OXYCODONE HYDROCHLORIDE 5 MILLIGRAM(S): 5 TABLET ORAL at 06:41

## 2023-04-28 RX ADMIN — Medication 975 MILLIGRAM(S): at 00:21

## 2023-04-28 RX ADMIN — Medication 975 MILLIGRAM(S): at 13:00

## 2023-04-28 RX ADMIN — Medication 600 MILLIGRAM(S): at 08:40

## 2023-04-28 RX ADMIN — Medication 600 MILLIGRAM(S): at 08:10

## 2023-04-28 RX ADMIN — Medication 100 MILLIGRAM(S): at 00:22

## 2023-04-28 RX ADMIN — Medication 325 MILLIGRAM(S): at 06:11

## 2023-04-28 RX ADMIN — OXYCODONE HYDROCHLORIDE 5 MILLIGRAM(S): 5 TABLET ORAL at 10:43

## 2023-04-28 RX ADMIN — SIMETHICONE 80 MILLIGRAM(S): 80 TABLET, CHEWABLE ORAL at 01:39

## 2023-04-28 RX ADMIN — OXYCODONE HYDROCHLORIDE 5 MILLIGRAM(S): 5 TABLET ORAL at 06:11

## 2023-04-28 RX ADMIN — Medication 975 MILLIGRAM(S): at 00:51

## 2023-04-28 RX ADMIN — Medication 600 MILLIGRAM(S): at 14:45

## 2023-04-28 RX ADMIN — Medication 500 MILLIGRAM(S): at 12:26

## 2023-04-28 RX ADMIN — HEPARIN SODIUM 5000 UNIT(S): 5000 INJECTION INTRAVENOUS; SUBCUTANEOUS at 00:22

## 2023-04-28 NOTE — DISCHARGE NOTE OB - CARE PROVIDER_API CALL
Marie Angulo  OBSTETRICS AND GYNECOLOGY  [address], [address]  [place] [address]  Phone: [phone]  Fax: [phone]  Follow Up Time:

## 2023-04-28 NOTE — DISCHARGE NOTE OB - MEDICATION SUMMARY - MEDICATIONS TO TAKE
I will START or STAY ON the medications listed below when I get home from the hospital:    ibuprofen 600 mg oral tablet  -- 1 tab(s) by mouth every 6 hours  -- Indication: For Cramping    acetaminophen 325 mg oral tablet  -- 3 tab(s) by mouth every 6 hours as needed for  mild pain  -- Indication: For mild pain

## 2023-04-28 NOTE — PROGRESS NOTE ADULT - PROBLEM SELECTOR PLAN 1
Increase OOB  Cont IV fluids and IV Ancef.  Pain protocol with Motrin, Tylenol and Oxycodone.   DVT ppx w/ heparin.  Regular diet.  F/U AM CBC  Routine Postpartum/Post-op care.
Increase OOB  PO Pain Protocol  Continue Regular Diet  Continue routine post op care

## 2023-04-28 NOTE — DISCHARGE NOTE OB - MATERIALS PROVIDED
Vaccinations/[facility]  Screening Program/  Immunization Record/Breastfeeding Log/Bottle Feeding Log/Breastfeeding Mother’s Support Group Information/Guide to Postpartum Care/[facility] Hearing Screen Program/Back To Sleep Handout/Breastfeeding Guide and Packet/Birth Certificate Instructions/Discharge Medication Information for Patients and Families Pocket Guide

## 2023-04-28 NOTE — DISCHARGE NOTE OB - PATIENT PORTAL LINK FT
You can access the FollowMyHealth Patient Portal offered by [facility] by registering at the following website: [email or web address]. By joining [facility]’s FollowMyHealth portal, you will also be able to view your health information using other applications (apps) compatible with our system.

## 2023-04-28 NOTE — DISCHARGE NOTE OB - CARE PLAN
1 Principal Discharge DX:	 delivery delivered  Assessment and plan of treatment:	Return to baseline health, regular diet, pain control. Follow up with Dr. Angulo.

## 2023-04-28 NOTE — PROGRESS NOTE ADULT - SUBJECTIVE AND OBJECTIVE BOX
Postpartum Note-  Section POD#2    Allergies: No Known Allergies    Blood type: O positive  Rubella: Immune:  RPR: Negative     S: Patient is a 22y  POD#2 s/p rLTCS.     Patient w/o complaints, pain is controlled.  Pt is OOB, tolerating PO, passing flatus, and voiding. Lochia WNL.     O:  Vital Signs Last 24 Hrs  T(C): 36.6 (2023 06:42), Max: 37 (2023 13:49)  T(F): 97.8 (2023 06:42), Max: 98.6 (2023 13:49)  HR: 91 (2023 06:42) (78 - 93)  BP: 115/75 (2023 06:42) (113/68 - 118/74)  BP(mean): --  RR: 18 (2023 06:42) (17 - 18)  SpO2: 98% (2023 06:42) (97% - 98%)    Parameters below as of 2023 06:42  Patient On (Oxygen Delivery Method): room air      Gen: NAD  Abdomen: Soft, nontender, non distended, fundus firm.  Incision: Clean, dry, and intact. Negative erythema/edema/ecchymosis. SubQ  Lochia WNL  Ext: Neg edema, Neg calf tenderness    LABS:                          9.5    10.48 )-----------( 104      ( 2023 06:38 )             29.3       A/P:  22y POD#2 s/p rLTCS, doing well.    PMHx: Denies  Current Issues: Anemia due to acute blood loss-pt asx and vitals stable, will continue to monitor     Increase OOB  PO Pain Protocol  Continue Regular Diet  Continue routine post op care    Yajaira Aguilar PA-C           Postpartum Note-  Section POD#2    Allergies: No Known Allergies    Blood type: O positive  Rubella: Immune:  RPR: Negative     S: Patient is a 22y  POD#2 s/p rLTCS.     Patient w/o complaints, pain is controlled.  Pt is OOB, tolerating PO, passing flatus, and voiding. Lochia WNL.     O:  Vital Signs Last 24 Hrs  T(C): 36.6 (2023 06:42), Max: 37 (2023 13:49)  T(F): 97.8 (2023 06:42), Max: 98.6 (2023 13:49)  HR: 91 (2023 06:42) (78 - 93)  BP: 115/75 (2023 06:42) (113/68 - 118/74)  BP(mean): --  RR: 18 (2023 06:42) (17 - 18)  SpO2: 98% (2023 06:42) (97% - 98%)    Parameters below as of 2023 06:42  Patient On (Oxygen Delivery Method): room air      Gen: NAD  Abdomen: Soft, nontender, non distended, fundus firm.  Incision: Clean, dry, and intact. Negative erythema/edema/ecchymosis. SubQ  Lochia WNL  Ext: Neg edema, Neg calf tenderness    LABS:                          9.5    10.48 )-----------( 104      ( 2023 06:38 )             29.3       A/P:  22y POD#2 s/p rLTCS, doing well.    PMHx: Denies  Current Issues: Anemia due to acute blood loss-pt asx and vitals stable, will continue Iron and Vitamin C and continue to monitor     Increase OOB  PO Pain Protocol  Continue Regular Diet  Continue routine post op care    Yajaira Aguilar PA-C

## 2023-04-28 NOTE — DISCHARGE NOTE OB - NS MD DC FALL RISK RISK
For information on Fall & Injury Prevention, visit: [email or web address] OR  [email or web address] OR  https://www.cdc.gov/steadi/patient.html

## 2025-03-05 ENCOUNTER — APPOINTMENT (OUTPATIENT)
Dept: UROLOGY | Facility: CLINIC | Age: 25
End: 2025-03-05